# Patient Record
Sex: MALE | Race: NATIVE HAWAIIAN OR OTHER PACIFIC ISLANDER | Employment: FULL TIME | URBAN - METROPOLITAN AREA
[De-identification: names, ages, dates, MRNs, and addresses within clinical notes are randomized per-mention and may not be internally consistent; named-entity substitution may affect disease eponyms.]

---

## 2019-09-21 ENCOUNTER — OFFICE VISIT (OUTPATIENT)
Dept: INTERNAL MEDICINE CLINIC | Facility: CLINIC | Age: 44
End: 2019-09-21
Payer: COMMERCIAL

## 2019-09-21 VITALS
DIASTOLIC BLOOD PRESSURE: 74 MMHG | HEIGHT: 65 IN | HEART RATE: 65 BPM | TEMPERATURE: 98 F | SYSTOLIC BLOOD PRESSURE: 116 MMHG | WEIGHT: 176 LBS | BODY MASS INDEX: 29.32 KG/M2

## 2019-09-21 DIAGNOSIS — M54.50 CHRONIC BILATERAL LOW BACK PAIN WITHOUT SCIATICA: ICD-10-CM

## 2019-09-21 DIAGNOSIS — Z83.49 FAMILY HISTORY OF HEMOCHROMATOSIS: ICD-10-CM

## 2019-09-21 DIAGNOSIS — G89.29 CHRONIC BILATERAL LOW BACK PAIN WITHOUT SCIATICA: ICD-10-CM

## 2019-09-21 DIAGNOSIS — Z00.00 ANNUAL PHYSICAL EXAM: Primary | ICD-10-CM

## 2019-09-21 PROCEDURE — 90686 IIV4 VACC NO PRSV 0.5 ML IM: CPT | Performed by: INTERNAL MEDICINE

## 2019-09-21 PROCEDURE — 90471 IMMUNIZATION ADMIN: CPT | Performed by: INTERNAL MEDICINE

## 2019-09-21 PROCEDURE — 99386 PREV VISIT NEW AGE 40-64: CPT | Performed by: INTERNAL MEDICINE

## 2019-09-21 NOTE — PROGRESS NOTES
HPI:    Patient ID: Wendy Mo is a 37year old male. Patient presents today for his annual physical.  He had been healthy in the past except for mild hyperlipidemia.  He also states had chronic intermittent back soreness for years for which he is Testes normal and penis normal. Right testis shows no mass, no swelling and no tenderness. Right testis is descended. Left testis shows no mass, no swelling and no tenderness. Left testis is descended. Circumcised. Musculoskeletal: He exhibits no edema.

## 2019-09-30 LAB
ABSOLUTE BASOPHILS: 31 CELLS/UL (ref 0–200)
ABSOLUTE EOSINOPHILS: 39 CELLS/UL (ref 15–500)
ABSOLUTE LYMPHOCYTES: 1630 CELLS/UL (ref 850–3900)
ABSOLUTE MONOCYTES: 378 CELLS/UL (ref 200–950)
ABSOLUTE NEUTROPHILS: 1821 CELLS/UL (ref 1500–7800)
ALBUMIN/GLOBULIN RATIO: 1.5 (CALC) (ref 1–2.5)
ALBUMIN: 4.5 G/DL (ref 3.6–5.1)
ALKALINE PHOSPHATASE: 73 U/L (ref 40–115)
ALT: 71 U/L (ref 9–46)
APPEARANCE: CLEAR
AST: 32 U/L (ref 10–40)
BASOPHILS: 0.8 %
BILIRUBIN, TOTAL: 1 MG/DL (ref 0.2–1.2)
BILIRUBIN: NEGATIVE
BUN: 12 MG/DL (ref 7–25)
CALCIUM: 9.6 MG/DL (ref 8.6–10.3)
CARBON DIOXIDE: 26 MMOL/L (ref 20–32)
CHLORIDE: 102 MMOL/L (ref 98–110)
CHOL/HDLC RATIO: 5.8 (CALC)
CHOLESTEROL, TOTAL: 221 MG/DL
COLOR: YELLOW
CREATININE: 1.06 MG/DL (ref 0.6–1.35)
EGFR IF AFRICN AM: 98 ML/MIN/1.73M2
EGFR IF NONAFRICN AM: 84 ML/MIN/1.73M2
EOSINOPHILS: 1 %
FERRITIN: 262 NG/ML (ref 38–380)
GLOBULIN: 3.1 G/DL (CALC) (ref 1.9–3.7)
GLUCOSE: 91 MG/DL (ref 65–99)
GLUCOSE: NEGATIVE
HDL CHOLESTEROL: 38 MG/DL
HEMATOCRIT: 45.2 % (ref 38.5–50)
HEMOGLOBIN: 15.5 G/DL (ref 13.2–17.1)
KETONES: NEGATIVE
LDL-CHOLESTEROL: 157 MG/DL (CALC)
LEUKOCYTE ESTERASE: NEGATIVE
LYMPHOCYTES: 41.8 %
MCH: 28.9 PG (ref 27–33)
MCHC: 34.3 G/DL (ref 32–36)
MCV: 84.2 FL (ref 80–100)
MONOCYTES: 9.7 %
MPV: 9.6 FL (ref 7.5–12.5)
NEUTROPHILS: 46.7 %
NITRITE: NEGATIVE
NON-HDL CHOLESTEROL: 183 MG/DL (CALC)
OCCULT BLOOD: NEGATIVE
PH: 6.5 (ref 5–8)
PLATELET COUNT: 285 THOUSAND/UL (ref 140–400)
POTASSIUM: 4.5 MMOL/L (ref 3.5–5.3)
PROTEIN, TOTAL: 7.6 G/DL (ref 6.1–8.1)
PROTEIN: NEGATIVE
RDW: 13.3 % (ref 11–15)
RED BLOOD CELL COUNT: 5.37 MILLION/UL (ref 4.2–5.8)
SODIUM: 138 MMOL/L (ref 135–146)
SPECIFIC GRAVITY: 1.01 (ref 1–1.03)
TRIGLYCERIDES: 132 MG/DL
WHITE BLOOD CELL COUNT: 3.9 THOUSAND/UL (ref 3.8–10.8)

## 2019-10-01 ENCOUNTER — TELEPHONE (OUTPATIENT)
Dept: INTERNAL MEDICINE CLINIC | Facility: CLINIC | Age: 44
End: 2019-10-01

## 2019-10-01 DIAGNOSIS — R79.89 ELEVATED LFTS: Primary | ICD-10-CM

## 2020-12-09 ENCOUNTER — TELEPHONE (OUTPATIENT)
Dept: INTERNAL MEDICINE CLINIC | Facility: CLINIC | Age: 45
End: 2020-12-09

## 2020-12-09 DIAGNOSIS — Z20.828 EXPOSURE TO SARS-ASSOCIATED CORONAVIRUS: Primary | ICD-10-CM

## 2020-12-09 NOTE — TELEPHONE ENCOUNTER
Per patient he was exposed to a person who is Covid+ last 12/04/2020 and would like to know if he can have a Covid test.  Please advise,

## 2020-12-09 NOTE — TELEPHONE ENCOUNTER
Caleb covid test.  Co-worker tested positive for covid 19 yesterday. Was last with this person on Friday 12-4-2020  Patient is asymptomatic a this time. Patient is asymptomatic. [facility] order pended. Patient advised that should wait at least 5-7 days from the date of exposure to be tested for COVID. If gets tested too early might have an inaccurate result(false negative). Patient has been advised on CDC guidelines for quarantine for 14 days, to monitor for symptoms, social distancing, mask wearing, and frequent hand washing. Also given information for CDC. gov and [facility]. [facility]. [facility] for further information. Patient advised to call back with any symptoms.

## 2020-12-10 ENCOUNTER — LAB ENCOUNTER (OUTPATIENT)
Dept: LAB | Age: 45
End: 2020-12-10
Attending: INTERNAL MEDICINE
Payer: COMMERCIAL

## 2020-12-10 DIAGNOSIS — Z20.828 EXPOSURE TO SARS-ASSOCIATED CORONAVIRUS: ICD-10-CM

## 2020-12-13 ENCOUNTER — TELEPHONE (OUTPATIENT)
Dept: INTERNAL MEDICINE CLINIC | Facility: CLINIC | Age: 45
End: 2020-12-13

## 2020-12-13 NOTE — TELEPHONE ENCOUNTER
Patient wife called patient got test results positive for  COVID-19-on 12/10  Patient is pretty much feeling well denies any fevers has some runny nose only for now .     Coronavirus COV 19 education    patient to stay home  avoid any close contacts   avoid

## 2021-04-01 DIAGNOSIS — Z23 NEED FOR VACCINATION: ICD-10-CM

## 2021-07-12 ENCOUNTER — OFFICE VISIT (OUTPATIENT)
Dept: INTERNAL MEDICINE CLINIC | Facility: CLINIC | Age: 46
End: 2021-07-12
Payer: COMMERCIAL

## 2021-07-12 VITALS
TEMPERATURE: 98 F | BODY MASS INDEX: 29.79 KG/M2 | HEART RATE: 73 BPM | DIASTOLIC BLOOD PRESSURE: 78 MMHG | SYSTOLIC BLOOD PRESSURE: 126 MMHG | WEIGHT: 178.81 LBS | HEIGHT: 65 IN

## 2021-07-12 DIAGNOSIS — G89.29 CHRONIC BILATERAL LOW BACK PAIN WITHOUT SCIATICA: ICD-10-CM

## 2021-07-12 DIAGNOSIS — F41.1 GAD (GENERALIZED ANXIETY DISORDER): ICD-10-CM

## 2021-07-12 DIAGNOSIS — Z12.11 COLON CANCER SCREENING: ICD-10-CM

## 2021-07-12 DIAGNOSIS — Z00.00 ANNUAL PHYSICAL EXAM: Primary | ICD-10-CM

## 2021-07-12 DIAGNOSIS — M54.50 CHRONIC BILATERAL LOW BACK PAIN WITHOUT SCIATICA: ICD-10-CM

## 2021-07-12 PROCEDURE — 3078F DIAST BP <80 MM HG: CPT | Performed by: INTERNAL MEDICINE

## 2021-07-12 PROCEDURE — 3074F SYST BP LT 130 MM HG: CPT | Performed by: INTERNAL MEDICINE

## 2021-07-12 PROCEDURE — 3008F BODY MASS INDEX DOCD: CPT | Performed by: INTERNAL MEDICINE

## 2021-07-12 PROCEDURE — 99396 PREV VISIT EST AGE 40-64: CPT | Performed by: INTERNAL MEDICINE

## 2021-07-12 NOTE — PROGRESS NOTES
HPI/Subjective:     Patient ID: Juan Potts is a 50year old male. Patient presents today for his annual physical.     Anxiety  This is a chronic problem. The current episode started more than 1 year ago. The problem occurs intermittently.  The prob Ear: External ear normal.      Left Ear: External ear normal.      Nose: Nose normal.      Mouth/Throat:      Pharynx: No oropharyngeal exudate. Eyes:      General: No scleral icterus. Right eye: No discharge. Left eye: No discharge. follow-up.       (F41.1) NAKIA (generalized anxiety disorder)  Plan: [address] [place] [employer]        Patient has a known history of anxiety disorder and has had this for years however recently had been progressively worsening with having anxiety attacks particularly when

## 2021-07-14 ENCOUNTER — TELEPHONE (OUTPATIENT)
Dept: INTERNAL MEDICINE CLINIC | Facility: CLINIC | Age: 46
End: 2021-07-14

## 2021-07-14 RX ORDER — ALPRAZOLAM 0.25 MG/1
0.25 TABLET ORAL NIGHTLY PRN
Qty: 15 TABLET | Refills: 0 | Status: SHIPPED | OUTPATIENT
Start: 2021-07-14 | End: 2021-08-10

## 2021-07-14 NOTE — TELEPHONE ENCOUNTER
Patient calling and states he had OV with Dr. Alonso Triplett on 7/12/21 where his anxiety issue was discussed.   Patient states for the past few nights the patient has had difficulty falling asleep, and then when he does, he sleeps for about an hour and then wa

## 2021-07-14 NOTE — TELEPHONE ENCOUNTER
Spoke with patient, verified  and name. Informed of Dr. Trina Price instructions below. Patient verbalizes understanding and agrees.

## 2021-07-14 NOTE — TELEPHONE ENCOUNTER
We can give him xanax 0.25mg po q hs prn for anxiety #15 erx sent for now while he is awaiting to see [facility]

## 2021-08-01 LAB
ABSOLUTE BASOPHILS: 31 CELLS/UL (ref 0–200)
ABSOLUTE EOSINOPHILS: 61 CELLS/UL (ref 15–500)
ABSOLUTE LYMPHOCYTES: 2326 CELLS/UL (ref 850–3900)
ABSOLUTE MONOCYTES: 418 CELLS/UL (ref 200–950)
ABSOLUTE NEUTROPHILS: 2264 CELLS/UL (ref 1500–7800)
ALBUMIN/GLOBULIN RATIO: 1.6 (CALC) (ref 1–2.5)
ALBUMIN: 4.6 G/DL (ref 3.6–5.1)
ALKALINE PHOSPHATASE: 74 U/L (ref 36–130)
ALT: 133 U/L (ref 9–46)
APPEARANCE: CLEAR
AST: 48 U/L (ref 10–40)
BASOPHILS: 0.6 %
BILIRUBIN, TOTAL: 1.1 MG/DL (ref 0.2–1.2)
BILIRUBIN: NEGATIVE
BUN: 16 MG/DL (ref 7–25)
CALCIUM: 9.9 MG/DL (ref 8.6–10.3)
CARBON DIOXIDE: 26 MMOL/L (ref 20–32)
CHLORIDE: 103 MMOL/L (ref 98–110)
CHOL/HDLC RATIO: 5.9 (CALC)
CHOLESTEROL, TOTAL: 249 MG/DL
COLOR: YELLOW
CREATININE: 0.95 MG/DL (ref 0.6–1.35)
EGFR IF AFRICN AM: 110 ML/MIN/1.73M2
EGFR IF NONAFRICN AM: 95 ML/MIN/1.73M2
EOSINOPHILS: 1.2 %
GLOBULIN: 2.9 G/DL (CALC) (ref 1.9–3.7)
GLUCOSE: 94 MG/DL (ref 65–99)
GLUCOSE: NEGATIVE
HDL CHOLESTEROL: 42 MG/DL
HEMATOCRIT: 46.5 % (ref 38.5–50)
HEMOGLOBIN: 15.6 G/DL (ref 13.2–17.1)
KETONES: NEGATIVE
LDL-CHOLESTEROL: 179 MG/DL (CALC)
LEUKOCYTE ESTERASE: NEGATIVE
LYMPHOCYTES: 45.6 %
MCH: 28.6 PG (ref 27–33)
MCHC: 33.5 G/DL (ref 32–36)
MCV: 85.3 FL (ref 80–100)
MONOCYTES: 8.2 %
MPV: 9.6 FL (ref 7.5–12.5)
NEUTROPHILS: 44.4 %
NITRITE: NEGATIVE
NON-HDL CHOLESTEROL: 207 MG/DL (CALC)
OCCULT BLOOD: NEGATIVE
PH: 6 (ref 5–8)
PLATELET COUNT: 278 THOUSAND/UL (ref 140–400)
POTASSIUM: 4.4 MMOL/L (ref 3.5–5.3)
PROTEIN, TOTAL: 7.5 G/DL (ref 6.1–8.1)
PROTEIN: NEGATIVE
RDW: 13.8 % (ref 11–15)
RED BLOOD CELL COUNT: 5.45 MILLION/UL (ref 4.2–5.8)
SODIUM: 138 MMOL/L (ref 135–146)
SPECIFIC GRAVITY: 1.01 (ref 1–1.03)
TRIGLYCERIDES: 139 MG/DL
TSH: 0.52 MIU/L (ref 0.4–4.5)
WHITE BLOOD CELL COUNT: 5.1 THOUSAND/UL (ref 3.8–10.8)

## 2021-08-09 ENCOUNTER — TELEPHONE (OUTPATIENT)
Dept: INTERNAL MEDICINE CLINIC | Facility: CLINIC | Age: 46
End: 2021-08-09

## 2021-08-09 ENCOUNTER — PATIENT MESSAGE (OUTPATIENT)
Dept: INTERNAL MEDICINE CLINIC | Facility: CLINIC | Age: 46
End: 2021-08-09

## 2021-08-09 RX ORDER — ALPRAZOLAM 0.25 MG/1
0.25 TABLET ORAL NIGHTLY PRN
Qty: 15 TABLET | Refills: 0 | Status: CANCELLED | OUTPATIENT
Start: 2021-08-09

## 2021-08-09 NOTE — TELEPHONE ENCOUNTER
----- Message from Jefferson Castro sent at 8/9/2021 12:04 AM CDT -----  Regarding: Prescription Question  Contact: [phone]  I would like to go over my current prescription for the alprazolam being used to treat my anxiety symptoms .   After taking a v

## 2021-08-09 NOTE — TELEPHONE ENCOUNTER
From: Esau Granados  To: Celsa Dempsey MD  Sent: 8/9/2021 12:04 AM CDT  Subject: Prescription Question    I would like to go over my current prescription for the alprazolam being used to treat my anxiety symptoms .  After taking a vacation where i

## 2021-08-09 NOTE — TELEPHONE ENCOUNTER
Patient states Dr. Cyndie Orta is aware of his anxiety issues and does not believe the dose of Xanax 0.25 mg is strong enough. Patient has only a couple of pills left.

## 2021-08-10 RX ORDER — ALPRAZOLAM 0.25 MG/1
0.25 TABLET ORAL 2 TIMES DAILY PRN
Qty: 60 TABLET | Refills: 0 | Status: SHIPPED | OUTPATIENT
Start: 2021-08-10

## 2021-08-15 ENCOUNTER — TELEPHONE (OUTPATIENT)
Dept: INTERNAL MEDICINE CLINIC | Facility: CLINIC | Age: 46
End: 2021-08-15

## 2021-08-15 DIAGNOSIS — R79.89 ELEVATED LFTS: Primary | ICD-10-CM

## 2021-08-29 LAB
ACTIN (SMOOTH MUSCLE)$ANTIBODY (IGG): <20 U
ALPHA-1-ANTITRYPSIN QN: 130 MG/DL (ref 83–199)
CERULOPLASMIN: 26 MG/DL (ref 18–36)
FERRITIN: 175 NG/ML (ref 38–380)
SIGNAL TO CUT-OFF: 0.02
TISSUE TRANSGLUTAMINASE$ANTIBODY, IGA: 1 U/ML

## 2021-12-28 ENCOUNTER — MED REC SCAN ONLY (OUTPATIENT)
Dept: INTERNAL MEDICINE CLINIC | Facility: CLINIC | Age: 46
End: 2021-12-28

## (undated) NOTE — LETTER
12/30/19        [address] [address]      Dear Zari Ahr,    Our records indicate that you have outstanding lab work and or testing that was ordered for you and has not yet been completed:  Orders Placed This [place]